# Patient Record
Sex: MALE | Race: WHITE | Employment: FULL TIME | URBAN - METROPOLITAN AREA
[De-identification: names, ages, dates, MRNs, and addresses within clinical notes are randomized per-mention and may not be internally consistent; named-entity substitution may affect disease eponyms.]

---

## 2021-11-07 ENCOUNTER — APPOINTMENT (OUTPATIENT)
Dept: GENERAL RADIOLOGY | Age: 51
End: 2021-11-07
Payer: COMMERCIAL

## 2021-11-07 ENCOUNTER — HOSPITAL ENCOUNTER (EMERGENCY)
Age: 51
Discharge: HOME | End: 2021-11-07
Attending: EMERGENCY MEDICINE
Payer: COMMERCIAL

## 2021-11-07 VITALS
OXYGEN SATURATION: 98 % | DIASTOLIC BLOOD PRESSURE: 87 MMHG | TEMPERATURE: 97.5 F | SYSTOLIC BLOOD PRESSURE: 138 MMHG | HEART RATE: 71 BPM | RESPIRATION RATE: 18 BRPM

## 2021-11-07 DIAGNOSIS — S82.831A CLOSED AVULSION FRACTURE OF DISTAL END OF RIGHT FIBULA, INITIAL ENCOUNTER: Primary | ICD-10-CM

## 2021-11-07 PROCEDURE — 73610 X-RAY EXAM OF ANKLE: CPT

## 2021-11-07 PROCEDURE — 99284 EMERGENCY DEPT VISIT MOD MDM: CPT

## 2021-11-07 ASSESSMENT — PAIN DESCRIPTION - PROGRESSION: CLINICAL_PROGRESSION: GRADUALLY IMPROVING

## 2021-11-07 ASSESSMENT — PAIN SCALES - GENERAL
PAINLEVEL_OUTOF10: 4
PAINLEVEL_OUTOF10: 4

## 2021-11-07 ASSESSMENT — PAIN DESCRIPTION - DESCRIPTORS: DESCRIPTORS: ACHING

## 2021-11-07 ASSESSMENT — PAIN DESCRIPTION - ONSET: ONSET: SUDDEN

## 2021-11-07 ASSESSMENT — PAIN DESCRIPTION - FREQUENCY: FREQUENCY: CONTINUOUS

## 2021-11-07 ASSESSMENT — PAIN DESCRIPTION - PAIN TYPE: TYPE: ACUTE PAIN

## 2021-11-07 ASSESSMENT — PAIN DESCRIPTION - ORIENTATION: ORIENTATION: RIGHT

## 2021-11-07 NOTE — ED NOTES
Right low top walker boot applied. Pt discharged from ED to home. Pt verbalizes understanding to discharge instructions, teach back successful. Pt denies questions at this time. No acute distress noted. Resp even and unlabored. A/ox4. Pt instructed to follow-up as noted - return to ED if symptoms worsen. Pt verbalizes understanding. Discharged per ED MD with discharge instructions. Pt refuses ambulatory assistance to lobby and walks with steady gait.         Lucio Stevens RN  11/07/21 6032

## 2021-11-07 NOTE — ED TRIAGE NOTES
Pt presents to ED ambulatory via PV with c/o of right ankle injury. Reports injury Friday evening while walking dog and stepped in hole. Reports pain is actually improving but needs work note to return to work. Denies any other injury. +bruising and swelling noted. Resp even and unlabored. A/ox4. No acute distress noted. Denies any need at this time. Call light within reach. Bed in lowest position. Will continue to monitor.

## 2021-11-07 NOTE — ED PROVIDER NOTES
[facility]  eMERGENCY dEPARTMENT eNCOUnter      Pt Name: Adair Nolasco  MRN: [id number]  [place] 1970  Date of evaluation: 11/7/2021  Provider: Trang Etienne MD    [address] [place] [address]       Chief Complaint   Patient presents with    Ankle Injury     right. injury friday evening. HISTORY OF PRESENT ILLNESS  (Location/Symptom, Timing/Onset, Context/Setting, Quality, Duration, Modifying Factors, Severity.)   Adiar Nolasco is a 54 y.o. male who presents to the emergency department complain of an injury to his right ankle that occurred on Friday night. He states he was walking his dog, he stepped in a hole, he \"rolled his ankle\". He has pain over his lateral ankle with some swelling. No knee pain. No foot pain. No numbness or tingling. Nursing Notes were reviewed and I agree. REVIEW OF SYSTEMS    (2-9 systems for level 4, 10 or more for level 5)     Musculoskeletal: Right lateral ankle pain. No knee or foot pain. Skin: No abrasions, bruising, or lacerations to the right lower extremity. Neuro: No extremity weakness numbness or tingling. Except as noted above the remainder of the review of systems was reviewed and negative. PAST MEDICAL HISTORY   History reviewed. No pertinent past medical history. SURGICAL HISTORY     History reviewed. No pertinent surgical history. CURRENT MEDICATIONS       Previous Medications    No medications on file       ALLERGIES     Patient has no known allergies. FAMILY HISTORY     History reviewed. No pertinent family history. No family status information on file. SOCIAL HISTORY      reports that he has never smoked. His smokeless tobacco use includes chew. He reports current alcohol use. He reports that he does not use drugs.     PHYSICAL EXAM    (up to 7 for level 4, 8 or more for level 5)     ED Triage Vitals [11/07/21 1408]   BP Temp Temp Source Pulse Resp SpO2 Height Weight   138/87 97.5 °F (36.4 °C) Tympanic 71 18 98 % -- --       General: Alert morbidly obese white male no acute distress. Musculoskeletal: Right knee is nontender without swelling. Intact range of motion without pain. Proximal mid tib-fib is nontender on the right. There is some mild swelling and tenderness over the lateral right ankle near the distal fibula. The medial ankle is nontender. The posterior ankle including the gastrocnemius muscle and Achilles tendon are nontender and intact. The hindfoot, midfoot, and distal foot are nontender. Intact range of motion of the digits. Intact distal pulses. Skin: Warm and dry, good turgor. No bruising, lacerations, or abrasions. Neuro: Intact motor function sensation right lower extremity. DIAGNOSTIC RESULTS     RADIOLOGY:   Non-plain film images such as CT, Ultrasound and MRI are read by the radiologist. Plain radiographic images are visualized and preliminarily interpreted by Luis Handley MD with the below findings:      Interpretation per the Radiologist below, if available at the time of this note:    XR ANKLE RIGHT (MIN 3 VIEWS)   Final Result   Acute, traumatic avulsion fracture of the distal fibula, versus lateral   aspect of the talus. LABS:  Labs Reviewed - No data to display    All other labs were within normal range or not returned as of this dictation. EMERGENCY DEPARTMENT COURSE and DIFFERENTIAL DIAGNOSIS/MDM:   Vitals:    Vitals:    11/07/21 1408   BP: 138/87   Pulse: 71   Resp: 18   Temp: 97.5 °F (36.4 °C)   TempSrc: Tympanic   SpO2: 98%       Patient injured his right ankle as above. He is tender over his distal fibula. He has an avulsion fracture likely of the distal fibula as interpreted by the radiologist.  This is consistent with his physical exam.  He was placed in a fracture boot. He was referred to orthopedics on call for follow-up. He was given a work note. He will use Tylenol and/or ibuprofen as needed for pain.   He understands he needs to follow-up with orthopedics for further evaluation definitive treatment of his ankle fracture. X-ray results, diagnosis, and treatment plan were discussed with the patient. Patient understands treatment plan follow-up as discussed. PROCEDURES:  None    FINAL IMPRESSION      1.  Closed avulsion fracture of distal end of right fibula, initial encounter          DISPOSITION/PLAN   DISPOSITION Decision To Discharge 11/07/2021 03:13:05 PM      PATIENT REFERRED TO:  David Gary MD  [facility] 21  [phone]    Schedule an appointment as soon as possible for a visit in 3 days        DISCHARGE MEDICATIONS:  New Prescriptions    No medications on file       (Please note that portions of this note were completed with a voice recognition program.  Efforts were made to edit the dictations but occasionally words are mis-transcribed.)    Stacey Whalen MD  Attending Emergency Physician        Josie Nicholson MD  11/07/21 5829

## 2021-11-07 NOTE — Clinical Note
Dimple Gibbs was seen and treated in our emergency department on 11/7/2021. He may return to work on 11/10/2021. If you have any questions or concerns, please don't hesitate to call.       Riley Posey RN